# Patient Record
Sex: FEMALE | Race: WHITE | NOT HISPANIC OR LATINO | Employment: OTHER | URBAN - METROPOLITAN AREA
[De-identification: names, ages, dates, MRNs, and addresses within clinical notes are randomized per-mention and may not be internally consistent; named-entity substitution may affect disease eponyms.]

---

## 2019-05-14 PROBLEM — E55.9 VITAMIN D DEFICIENCY: Status: ACTIVE | Noted: 2019-05-14

## 2019-05-14 PROBLEM — E11.9 TYPE 2 DIABETES MELLITUS: Status: ACTIVE | Noted: 2019-05-14

## 2019-05-14 PROBLEM — I10 ESSENTIAL HYPERTENSION: Status: ACTIVE | Noted: 2019-05-14

## 2019-05-14 PROBLEM — N18.30 CKD (CHRONIC KIDNEY DISEASE) STAGE 3, GFR 30-59 ML/MIN: Status: ACTIVE | Noted: 2019-05-14

## 2019-05-21 PROBLEM — E03.9 ACQUIRED HYPOTHYROIDISM: Status: ACTIVE | Noted: 2019-05-21

## 2019-05-21 PROBLEM — E78.5 DYSLIPIDEMIA: Status: ACTIVE | Noted: 2019-05-21

## 2020-11-02 PROBLEM — K76.82 HEPATIC ENCEPHALOPATHY: Status: ACTIVE | Noted: 2020-11-02

## 2021-05-12 ENCOUNTER — PATIENT MESSAGE (OUTPATIENT)
Dept: RESEARCH | Facility: HOSPITAL | Age: 56
End: 2021-05-12

## 2021-07-01 PROBLEM — H90.3 SENSORINEURAL HEARING LOSS (SNHL) OF BOTH EARS: Status: ACTIVE | Noted: 2021-07-01

## 2021-07-01 PROBLEM — H73.22 MYRINGITIS OF LEFT EAR: Status: ACTIVE | Noted: 2021-07-01

## 2021-08-17 PROBLEM — H91.90 HEARING LOSS: Status: ACTIVE | Noted: 2021-08-17

## 2022-11-07 PROBLEM — K08.109 EDENTULOUS: Status: ACTIVE | Noted: 2022-11-07

## 2023-06-08 PROBLEM — Z98.890 S/P TYMPANOPLASTY: Status: ACTIVE | Noted: 2023-06-08

## 2023-06-08 PROBLEM — H72.12: Status: ACTIVE | Noted: 2023-06-08

## 2023-07-13 PROBLEM — M65.331 TRIGGER FINGER, RIGHT MIDDLE FINGER: Status: ACTIVE | Noted: 2023-07-13

## 2023-08-28 PROBLEM — M70.62 TROCHANTERIC BURSITIS OF LEFT HIP: Status: ACTIVE | Noted: 2023-08-28

## 2024-04-26 PROBLEM — H02.834 DERMATOCHALASIS OF BOTH UPPER EYELIDS: Status: ACTIVE | Noted: 2024-04-26

## 2024-04-26 PROBLEM — H50.111 EXOTROPIA, RIGHT EYE: Status: ACTIVE | Noted: 2024-04-26

## 2024-04-26 PROBLEM — H02.831 DERMATOCHALASIS OF BOTH UPPER EYELIDS: Status: ACTIVE | Noted: 2024-04-26

## 2024-09-20 PROBLEM — K21.00 GASTROESOPHAGEAL REFLUX DISEASE WITH ESOPHAGITIS WITHOUT HEMORRHAGE: Status: ACTIVE | Noted: 2024-09-20

## 2024-09-20 PROBLEM — K63.5 POLYP OF COLON: Status: ACTIVE | Noted: 2024-09-20

## 2025-03-11 PROBLEM — L84 CORNS AND CALLUS: Status: ACTIVE | Noted: 2025-03-11

## 2025-03-11 PROBLEM — L60.3 NAIL DYSTROPHY: Status: ACTIVE | Noted: 2025-03-11

## 2025-03-11 PROBLEM — B35.1 ONYCHOMYCOSIS: Status: ACTIVE | Noted: 2025-03-11

## 2025-07-02 ENCOUNTER — NURSE TRIAGE (OUTPATIENT)
Dept: ADMINISTRATIVE | Facility: CLINIC | Age: 60
End: 2025-07-02
Payer: MEDICAID

## 2025-07-03 NOTE — TELEPHONE ENCOUNTER
Pt states scheduled for appt in AM but does not have a ride, pt wants to reschedule appt.   Reason for Disposition   [1] Caller requesting NON-URGENT health information AND [2] PCP's office is the best resource    Protocols used: Information Only Call - No Triage-A-

## 2025-08-12 ENCOUNTER — PATIENT OUTREACH (OUTPATIENT)
Dept: ADMINISTRATIVE | Facility: HOSPITAL | Age: 60
End: 2025-08-12
Payer: MEDICAID

## 2025-08-12 PROBLEM — K08.109 EDENTULOUS: Status: RESOLVED | Noted: 2022-11-07 | Resolved: 2025-08-12

## 2025-08-12 PROBLEM — M65.331 TRIGGER FINGER, RIGHT MIDDLE FINGER: Status: RESOLVED | Noted: 2023-07-13 | Resolved: 2025-08-12

## 2025-08-12 PROBLEM — F31.9 BIPOLAR DISORDER: Status: ACTIVE | Noted: 2025-08-12

## 2025-08-12 PROBLEM — M85.89 OSTEOPENIA OF MULTIPLE SITES: Status: ACTIVE | Noted: 2025-08-12

## 2025-08-12 PROBLEM — E87.6 HYPOKALEMIA: Status: ACTIVE | Noted: 2025-08-12

## 2025-08-12 PROBLEM — E66.3 OVERWEIGHT (BMI 25.0-29.9): Status: ACTIVE | Noted: 2025-08-12

## 2025-08-12 PROBLEM — M70.62 TROCHANTERIC BURSITIS OF LEFT HIP: Status: RESOLVED | Noted: 2023-08-28 | Resolved: 2025-08-12

## 2025-08-12 PROBLEM — F17.200 TOBACCO DEPENDENCE: Status: ACTIVE | Noted: 2025-08-12

## 2025-08-18 ENCOUNTER — PATIENT MESSAGE (OUTPATIENT)
Dept: ADMINISTRATIVE | Facility: HOSPITAL | Age: 60
End: 2025-08-18
Payer: MEDICARE

## 2025-08-29 ENCOUNTER — PATIENT MESSAGE (OUTPATIENT)
Dept: ADMINISTRATIVE | Facility: HOSPITAL | Age: 60
End: 2025-08-29
Payer: MEDICARE